# Patient Record
Sex: FEMALE | Race: WHITE | NOT HISPANIC OR LATINO | URBAN - METROPOLITAN AREA
[De-identification: names, ages, dates, MRNs, and addresses within clinical notes are randomized per-mention and may not be internally consistent; named-entity substitution may affect disease eponyms.]

---

## 2019-06-10 ENCOUNTER — APPOINTMENT (OUTPATIENT)
Dept: RADIOLOGY | Facility: MEDICAL CENTER | Age: 44
End: 2019-06-10
Attending: EMERGENCY MEDICINE
Payer: OTHER MISCELLANEOUS

## 2019-06-10 ENCOUNTER — HOSPITAL ENCOUNTER (EMERGENCY)
Facility: MEDICAL CENTER | Age: 44
End: 2019-06-10
Attending: EMERGENCY MEDICINE
Payer: OTHER MISCELLANEOUS

## 2019-06-10 VITALS
BODY MASS INDEX: 23.01 KG/M2 | RESPIRATION RATE: 18 BRPM | DIASTOLIC BLOOD PRESSURE: 80 MMHG | TEMPERATURE: 97.8 F | WEIGHT: 146.61 LBS | HEART RATE: 81 BPM | OXYGEN SATURATION: 97 % | HEIGHT: 67 IN | SYSTOLIC BLOOD PRESSURE: 118 MMHG

## 2019-06-10 DIAGNOSIS — M25.552 LEFT HIP PAIN: ICD-10-CM

## 2019-06-10 PROCEDURE — 96372 THER/PROPH/DIAG INJ SC/IM: CPT

## 2019-06-10 PROCEDURE — 73501 X-RAY EXAM HIP UNI 1 VIEW: CPT | Mod: LT

## 2019-06-10 PROCEDURE — 99284 EMERGENCY DEPT VISIT MOD MDM: CPT

## 2019-06-10 PROCEDURE — 700111 HCHG RX REV CODE 636 W/ 250 OVERRIDE (IP): Performed by: EMERGENCY MEDICINE

## 2019-06-10 RX ORDER — METHOCARBAMOL 750 MG/1
750 TABLET, FILM COATED ORAL 4 TIMES DAILY
Qty: 20 TAB | Refills: 0 | Status: SHIPPED | OUTPATIENT
Start: 2019-06-10

## 2019-06-10 RX ORDER — HYDROXYZINE 50 MG/1
50 TABLET, FILM COATED ORAL 3 TIMES DAILY PRN
COMMUNITY

## 2019-06-10 RX ORDER — LITHIUM CARBONATE 300 MG
300 TABLET ORAL 3 TIMES DAILY
COMMUNITY

## 2019-06-10 RX ORDER — KETOROLAC TROMETHAMINE 30 MG/ML
30 INJECTION, SOLUTION INTRAMUSCULAR; INTRAVENOUS ONCE
Status: COMPLETED | OUTPATIENT
Start: 2019-06-10 | End: 2019-06-10

## 2019-06-10 RX ORDER — NAPROXEN 500 MG/1
500 TABLET ORAL 2 TIMES DAILY WITH MEALS
Qty: 20 TAB | Refills: 0 | Status: SHIPPED | OUTPATIENT
Start: 2019-06-10

## 2019-06-10 RX ADMIN — KETOROLAC TROMETHAMINE 30 MG: 30 INJECTION, SOLUTION INTRAMUSCULAR at 16:15

## 2019-06-10 ASSESSMENT — PAIN DESCRIPTION - DESCRIPTORS: DESCRIPTORS: SHOOTING;SHARP

## 2019-06-10 NOTE — ED PROVIDER NOTES
"ED Provider  Scribed for Ace Kramer D.O. by Ajit Motta. 6/10/2019  3:29 PM    Means of arrival:Walk in  History obtained from:Patient  History limited by: None    CHIEF COMPLAINT  Chief Complaint   Patient presents with   • Hip Pain       HPI  Aurora Kay is a 43 y.o. female who has a history of hip dyplasia presents for severe left sided hip pain onset the past 3 days. She endorses associated pain radiation to her left knee. Her pain is described as pulsating and constant and she cannot lay down on her left side without feeling pain.She has had 2 hip replacements on her right hip and none on her left. She currently resides at the shelter an walks up and down stairs an extensive amount exacerbating her hip pain. Denies any other pain.    REVIEW OF SYSTEMS  See HPI for further details. All other systems are negative.     PAST MEDICAL HISTORY   has a past medical history of Bipolar 1 disorder (HCC); Cataract; Hip dysplasia; PTSD (post-traumatic stress disorder); and Vertigo.    SOCIAL HISTORY  Social History     Social History Main Topics   • Smoking status: Current Every Day Smoker     Packs/day: 0.50     Types: Cigarettes   • Smokeless tobacco: Never Used   • Alcohol use No   • Drug use: No       SURGICAL HISTORY   has a past surgical history that includes hysterectomy, vaginal and hip replacement, total (Right).    CURRENT MEDICATIONS  Home Medications     Reviewed by Andra Bello R.N. (Registered Nurse) on 06/10/19 at 1507  Med List Status: Partial   Medication Last Dose Status   hydrOXYzine HCl (ATARAX) 50 MG Tab 6/9/2019 Active   lithium (ESKALITH) 300 MG Tab 6/9/2019 Active                ALLERGIES  Allergies   Allergen Reactions   • Topamax [Topiramate] Swelling       PHYSICAL EXAM  VITAL SIGNS: /81   Pulse 83   Temp 36.6 °C (97.8 °F) (Temporal)   Resp 16   Ht 1.702 m (5' 7\")   Wt 66.5 kg (146 lb 9.7 oz)   SpO2 97%   BMI 22.96 kg/m²   Constitutional: Alert in no apparent " distress.  HENT: Normocephalic, Atraumatic, mucous membranes are moist.   Eyes: Conjunctiva normal, non-icteric.   Heart: No peripheral cyanosis   Lungs: Respirations are even and unlabored   Skin: Warm, Dry, normal color.   Extremities: Left hip has paraspinous musculature spasm and tenderness at sacral and piriformis  Neurologic: Alert, Strength and sensation to lower extremities are normal bilaterally, Grossly non-focal.   Psychiatric: Affect normal, Judgment normal, Mood normal, Appears appropriate and not intoxicated.       MEDICAL DECISION MAKING  This is a 43 y.o. female who presents with left hip pain.  She has a history of hip dysplasia with a right hip replacement.  Her pain is in the left hip.  She is staying at shelters but got up and down ladders and believes she may have strained herself at that point.  She had tenderness in the paraspinal muscles along the sacrum and in the piriformis.  I believe this is musculoskeletal in nature.  Suspect any nerve damage as she has a normal neurological x-rays showed no acute disease she is stable for discharge with symptomatic care    DIAGNOSTIC STUDIES / PROCEDURES    RADIOLOGY  DX-HIP-UNILATERAL-WITH PELVIS-1 VIEW LEFT   Final Result      1.  Mild degenerative change of the left hip.      2.  Post right hip arthroplasty.        The radiologist's interpretations of all radiological studies have been reviewed by me.        COURSE  Pertinent Labs & Imaging studies reviewed. (See chart for details)    3:29 PM - Patient seen and examined at bedside. Discussed plan of care. The patient will be medicated with ketorolac 30 mg. Ordered for left hip x-ray to evaluate her symptoms.         {The patient is referred to a primary physician for blood pressure management, diabetic screening, and for all other preventative health concerns.      DISPOSITION:  Patient will be discharged home in stable condition.    FOLLOW UP:  [address]  [address]  [phone]  In 3 days        OUTPATIENT MEDICATIONS:  New Prescriptions    METHOCARBAMOL (ROBAXIN) 750 MG TAB    Take 1 Tab by mouth 4 times a day.    NAPROXEN (NAPROSYN) 500 MG TAB    Take 1 Tab by mouth 2 times a day, with meals.        FINAL IMPRESSION  1. Left hip pain         Ajit MISTRY (Scribe), am scribing for, and in the presence of, Ace Kramer D.O..    Electronically signed by: Ajit Motta (Scribe), 6/10/2019    Ace MISTRY D.O. personally performed the services described in this documentation, as scribed by Ajit Motta in my presence, and it is both accurate and complete. E.     The note accurately reflects work and decisions made by me.  Ace Kramer  6/10/2019  5:12 PM
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
## 2019-06-11 NOTE — DISCHARGE INSTRUCTIONS
X-rays showed no new disease.  You are being treated with pain medication and muscle relaxers.  The muscle relaxers will cause drowsiness so be careful using those.  Otherwise follow-up with the primary care physician, activity as tolerated

## 2019-06-14 ENCOUNTER — HOSPITAL ENCOUNTER (EMERGENCY)
Dept: HOSPITAL 8 - ED | Age: 44
Discharge: HOME | End: 2019-06-14
Payer: SELF-PAY

## 2019-06-14 VITALS — HEIGHT: 67 IN | BODY MASS INDEX: 22.7 KG/M2 | WEIGHT: 144.62 LBS

## 2019-06-14 VITALS — SYSTOLIC BLOOD PRESSURE: 106 MMHG | DIASTOLIC BLOOD PRESSURE: 70 MMHG

## 2019-06-14 DIAGNOSIS — J15.9: ICD-10-CM

## 2019-06-14 DIAGNOSIS — J12.9: Primary | ICD-10-CM

## 2019-06-14 DIAGNOSIS — F17.210: ICD-10-CM

## 2019-06-14 DIAGNOSIS — F31.9: ICD-10-CM

## 2019-06-14 DIAGNOSIS — F41.9: ICD-10-CM

## 2019-06-14 DIAGNOSIS — J45.909: ICD-10-CM

## 2019-06-14 LAB
ALBUMIN SERPL-MCNC: 3.3 G/DL (ref 3.4–5)
ALP SERPL-CCNC: 91 U/L (ref 45–117)
ALT SERPL-CCNC: 18 U/L (ref 12–78)
ANION GAP SERPL CALC-SCNC: 7 MMOL/L (ref 5–15)
BASOPHILS # BLD AUTO: 0.02 X10^3/UL (ref 0–0.1)
BASOPHILS NFR BLD AUTO: 0 % (ref 0–1)
BILIRUB SERPL-MCNC: 0.5 MG/DL (ref 0.2–1)
CALCIUM SERPL-MCNC: 8.3 MG/DL (ref 8.5–10.1)
CHLORIDE SERPL-SCNC: 112 MMOL/L (ref 98–107)
CREAT SERPL-MCNC: 0.78 MG/DL (ref 0.55–1.02)
EOSINOPHIL # BLD AUTO: 0.06 X10^3/UL (ref 0–0.4)
EOSINOPHIL NFR BLD AUTO: 1 % (ref 1–7)
ERYTHROCYTE [DISTWIDTH] IN BLOOD BY AUTOMATED COUNT: 13.6 % (ref 9.6–15.2)
LYMPHOCYTES # BLD AUTO: 0.95 X10^3/UL (ref 1–3.4)
LYMPHOCYTES NFR BLD AUTO: 14 % (ref 22–44)
MCH RBC QN AUTO: 32.2 PG (ref 27–34.8)
MCHC RBC AUTO-ENTMCNC: 32.6 G/DL (ref 32.4–35.8)
MCV RBC AUTO: 99 FL (ref 80–100)
MD: NO
MONOCYTES # BLD AUTO: 0.46 X10^3/UL (ref 0.2–0.8)
MONOCYTES NFR BLD AUTO: 7 % (ref 2–9)
NEUTROPHILS # BLD AUTO: 5.21 X10^3/UL (ref 1.8–6.8)
NEUTROPHILS NFR BLD AUTO: 78 % (ref 42–75)
PLATELET # BLD AUTO: 177 X10^3/UL (ref 130–400)
PMV BLD AUTO: 8.1 FL (ref 7.4–10.4)
PROT SERPL-MCNC: 6.7 G/DL (ref 6.4–8.2)
RBC # BLD AUTO: 3.87 X10^6/UL (ref 3.82–5.3)

## 2019-06-14 PROCEDURE — 94640 AIRWAY INHALATION TREATMENT: CPT

## 2019-06-14 PROCEDURE — 36415 COLL VENOUS BLD VENIPUNCTURE: CPT

## 2019-06-14 PROCEDURE — 85025 COMPLETE CBC W/AUTO DIFF WBC: CPT

## 2019-06-14 PROCEDURE — 99284 EMERGENCY DEPT VISIT MOD MDM: CPT

## 2019-06-14 PROCEDURE — 80053 COMPREHEN METABOLIC PANEL: CPT

## 2019-06-14 PROCEDURE — 93005 ELECTROCARDIOGRAM TRACING: CPT

## 2019-06-14 PROCEDURE — 71045 X-RAY EXAM CHEST 1 VIEW: CPT

## 2019-06-25 ENCOUNTER — HOSPITAL ENCOUNTER (EMERGENCY)
Dept: HOSPITAL 8 - ED | Age: 44
Discharge: HOME | End: 2019-06-25
Payer: SELF-PAY

## 2019-06-25 VITALS — HEIGHT: 67 IN | WEIGHT: 146.61 LBS | BODY MASS INDEX: 23.01 KG/M2

## 2019-06-25 VITALS — SYSTOLIC BLOOD PRESSURE: 144 MMHG | DIASTOLIC BLOOD PRESSURE: 98 MMHG
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
## 2019-06-25 DIAGNOSIS — M25.532: Primary | ICD-10-CM

## 2019-06-25 DIAGNOSIS — Z88.8: ICD-10-CM

## 2019-06-25 PROCEDURE — 99283 EMERGENCY DEPT VISIT LOW MDM: CPT

## 2019-06-25 PROCEDURE — 29125 APPL SHORT ARM SPLINT STATIC: CPT
